# Patient Record
Sex: MALE | Race: WHITE
[De-identification: names, ages, dates, MRNs, and addresses within clinical notes are randomized per-mention and may not be internally consistent; named-entity substitution may affect disease eponyms.]

---

## 2021-01-21 ENCOUNTER — HOSPITAL ENCOUNTER (EMERGENCY)
Dept: HOSPITAL 75 - ER | Age: 31
Discharge: HOME | End: 2021-01-21
Payer: COMMERCIAL

## 2021-01-21 VITALS — BODY MASS INDEX: 51.23 KG/M2 | WEIGHT: 315 LBS | HEIGHT: 65.75 IN

## 2021-01-21 VITALS — SYSTOLIC BLOOD PRESSURE: 145 MMHG | DIASTOLIC BLOOD PRESSURE: 92 MMHG

## 2021-01-21 DIAGNOSIS — E66.9: ICD-10-CM

## 2021-01-21 DIAGNOSIS — U07.1: Primary | ICD-10-CM

## 2021-01-21 DIAGNOSIS — R04.2: ICD-10-CM

## 2021-01-21 LAB
ALBUMIN SERPL-MCNC: 3.9 GM/DL (ref 3.2–4.5)
ALP SERPL-CCNC: 109 U/L (ref 40–136)
ALT SERPL-CCNC: 42 U/L (ref 0–55)
BASOPHILS # BLD AUTO: 0 10^3/UL (ref 0–0.1)
BASOPHILS NFR BLD AUTO: 0 % (ref 0–10)
BILIRUB SERPL-MCNC: 0.5 MG/DL (ref 0.1–1)
BUN/CREAT SERPL: 13
CALCIUM SERPL-MCNC: 9 MG/DL (ref 8.5–10.1)
CHLORIDE SERPL-SCNC: 109 MMOL/L (ref 98–107)
CO2 SERPL-SCNC: 24 MMOL/L (ref 21–32)
CREAT SERPL-MCNC: 0.95 MG/DL (ref 0.6–1.3)
EOSINOPHIL # BLD AUTO: 0 10^3/UL (ref 0–0.3)
EOSINOPHIL NFR BLD AUTO: 0 % (ref 0–10)
GFR SERPLBLD BASED ON 1.73 SQ M-ARVRAT: > 60 ML/MIN
GLUCOSE SERPL-MCNC: 112 MG/DL (ref 70–105)
HCT VFR BLD CALC: 45 % (ref 40–54)
HGB BLD-MCNC: 14.6 G/DL (ref 13.3–17.7)
LYMPHOCYTES # BLD AUTO: 1.2 10^3/UL (ref 1–4)
LYMPHOCYTES NFR BLD AUTO: 14 % (ref 12–44)
MANUAL DIFFERENTIAL PERFORMED BLD QL: NO
MCH RBC QN AUTO: 28 PG (ref 25–34)
MCHC RBC AUTO-ENTMCNC: 32 G/DL (ref 32–36)
MCV RBC AUTO: 86 FL (ref 80–99)
MONOCYTES # BLD AUTO: 0.3 10^3/UL (ref 0–1)
MONOCYTES NFR BLD AUTO: 4 % (ref 0–12)
NEUTROPHILS # BLD AUTO: 7.2 10^3/UL (ref 1.8–7.8)
NEUTROPHILS NFR BLD AUTO: 82 % (ref 42–75)
PLATELET # BLD: 267 10^3/UL (ref 130–400)
PMV BLD AUTO: 10.1 FL (ref 9–12.2)
POTASSIUM SERPL-SCNC: 3.4 MMOL/L (ref 3.6–5)
PROT SERPL-MCNC: 8 GM/DL (ref 6.4–8.2)
SODIUM SERPL-SCNC: 145 MMOL/L (ref 135–145)
WBC # BLD AUTO: 8.8 10^3/UL (ref 4.3–11)

## 2021-01-21 PROCEDURE — 85379 FIBRIN DEGRADATION QUANT: CPT

## 2021-01-21 PROCEDURE — 85025 COMPLETE CBC W/AUTO DIFF WBC: CPT

## 2021-01-21 PROCEDURE — 80053 COMPREHEN METABOLIC PANEL: CPT

## 2021-01-21 PROCEDURE — 71275 CT ANGIOGRAPHY CHEST: CPT

## 2021-01-21 PROCEDURE — 84145 PROCALCITONIN (PCT): CPT

## 2021-01-21 PROCEDURE — 36415 COLL VENOUS BLD VENIPUNCTURE: CPT

## 2021-01-21 PROCEDURE — 86141 C-REACTIVE PROTEIN HS: CPT

## 2021-01-21 NOTE — DIAGNOSTIC IMAGING REPORT
PROCEDURE: CT angiography of the chest with contrast.



TECHNIQUE: Multiple contiguous axial images were obtained through

the chest after uneventful bolus administration of intravenous

contrast. 3D reconstructed CTA MIP acquisitions were also

performed.

Auto Exposure Controls were utilized during the CT exam to meet

ALARA standards for radiation dose reduction. 



INDICATION: COVID, hemoptysis.



COMPARISON: No relevant comparison.



FINDINGS: The pulmonary arterial luminal opacification is limited

beyond the lobar level. No identifiable central PE. There are

patchy five-lobe infiltrates, largely groundglass in opacity,

while nonspecific, congruent with the provided history of COVID.

There is no effusion, abscess, or pneumothorax. Heart size and

pulmonary vascularity are unremarkable. Visualized upper abdomen

is unremarkable.



IMPRESSION: Five-lobe groundglass infiltrates. Limited technical

evaluation of the pulmonary arterial branches. No central or

visualized PE.



Dictated by: 



  Dictated on workstation # [id number]

## 2022-03-30 NOTE — ED COUGH/URI
General


Chief Complaint:  Cough/Cold/Flu Symptoms


Stated Complaint:  COVID +, SOB, BLOODY COUGH


Source:  patient


Exam Limitations:  no limitations





History of Present Illness


Date Seen by Provider:  Jan 21, 2021


Time Seen by Provider:  11:10


Initial Comments


To ER with reports of hemoptysis.  This began last night.  He started a baby 

aspirin daily last night.  He tested positive for Covid earlier this week but 

became symptomatic 8 days ago.  He has slight shortness of breath but states 

that his oxygen saturation has never dropped below 95% at home.  He has no 

comorbidities other than obesity.


Timing/Duration:  yesterday


Severity/Quality:  moderate


Associated Symptoms:  cough, shortness of breath





Allergies and Home Medications


Allergies


Coded Allergies:  


     No Allergy Information Available (Unverified , 1/21/21)





Patient Home Medication List


Home Medication List Reviewed:  Yes





Review of Systems


Review of Systems


Constitutional:  see HPI


EENTM:  see HPI


Respiratory:  see HPI, cough


Cardiovascular:  no symptoms reported


Genitourinary:  no symptoms reported


Musculoskeletal:  no symptoms reported


Skin:  no symptoms reported


Psychiatric/Neurological:  No Symptoms Reported


Hematologic/Lymphatic:  No Symptoms Reported


Immunological/Allergic:  no symptoms reported





Physical Exam





Vital Signs - First Documented








 1/21/21





 11:34


 


Pulse 100


 


Resp 18


 


B/P (MAP) 145/92 (109)


 


Pulse Ox 97


 


O2 Delivery Room Air





Capillary Refill :


Height: '"


Weight: lbs. oz. kg;  BMI


Method:


General Appearance:  WD/WN, no apparent distress, obese, other (And oriented 

very pleasant alert and oriented very pleasant.  Oxygen saturation 94 to 95% on 

room air.  He states he has been checking it at home and it has never been below

95%.)


Eyes:  Bilateral Eye Normal Inspection, Bilateral Eye PERRL, Bilateral Eye EOMI


HEENT:  PERRL/EOMI, normal ENT inspection


Neck:  non-tender, full range of motion


Respiratory:  lungs clear, normal breath sounds, no respiratory distress, no 

accessory muscle use


Cardiovascular:  regular rate, rhythm, no murmur


Gastrointestinal:  normal bowel sounds, non tender, soft


Extremities:  normal range of motion, non-tender


Neurologic/Psychiatric:  alert, normal mood/affect, oriented x 3


Skin:  normal color, warm/dry





Progress/Results/Core Measures


Suspected Sepsis


SIRS


Temperature: 


Pulse:  


Respiratory Rate: 


 


Laboratory Tests


1/21/21 11:43: White Blood Count 8.8


Blood Pressure  / 


Mean: 


 





Laboratory Tests


1/21/21 11:43: 


Creatinine 0.95, Platelet Count 267, Total Bilirubin 0.5








Results/Orders


Lab Results





Laboratory Tests








Test


 1/21/21


11:43 Range/Units


 


 


White Blood Count


 8.8 


 4.3-11.0


10^3/uL


 


Red Blood Count


 5.25 


 4.30-5.52


10^6/uL


 


Hemoglobin 14.6  13.3-17.7  g/dL


 


Hematocrit 45  40-54  %


 


Mean Corpuscular Volume 86  80-99  fL


 


Mean Corpuscular Hemoglobin 28  25-34  pg


 


Mean Corpuscular Hemoglobin


Concent 32 


 32-36  g/dL





 


Red Cell Distribution Width 14.3  10.0-14.5  %


 


Platelet Count


 267 


 130-400


10^3/uL


 


Mean Platelet Volume 10.1  9.0-12.2  fL


 


Immature Granulocyte % (Auto) 0   %


 


Neutrophils (%) (Auto) 82 H 42-75  %


 


Lymphocytes (%) (Auto) 14  12-44  %


 


Monocytes (%) (Auto) 4  0-12  %


 


Eosinophils (%) (Auto) 0  0-10  %


 


Basophils (%) (Auto) 0  0-10  %


 


Neutrophils # (Auto)


 7.2 


 1.8-7.8


10^3/uL


 


Lymphocytes # (Auto)


 1.2 


 1.0-4.0


10^3/uL


 


Monocytes # (Auto)


 0.3 


 0.0-1.0


10^3/uL


 


Eosinophils # (Auto)


 0.0 


 0.0-0.3


10^3/uL


 


Basophils # (Auto)


 0.0 


 0.0-0.1


10^3/uL


 


Immature Granulocyte # (Auto)


 0.0 


 0.0-0.1


10^3/uL


 


D-Dimer


 0.55 H


 0.00-0.49


UG/ML


 


Sodium Level 145  135-145  MMOL/L


 


Potassium Level 3.4 L 3.6-5.0  MMOL/L


 


Chloride Level 109 H   MMOL/L


 


Carbon Dioxide Level 24  21-32  MMOL/L


 


Anion Gap 12  5-14  MMOL/L


 


Blood Urea Nitrogen 12  7-18  MG/DL


 


Creatinine


 0.95 


 0.60-1.30


MG/DL


 


Estimat Glomerular Filtration


Rate > 60 


  





 


BUN/Creatinine Ratio 13   


 


Glucose Level 112 H   MG/DL


 


Calcium Level 9.0  8.5-10.1  MG/DL


 


Corrected Calcium 9.1  8.5-10.1  MG/DL


 


Total Bilirubin 0.5  0.1-1.0  MG/DL


 


Aspartate Amino Transf


(AST/SGOT) 35 H


 5-34  U/L





 


Alanine Aminotransferase


(ALT/SGPT) 42 


 0-55  U/L





 


Alkaline Phosphatase 109    U/L


 


C-Reactive Protein High


Sensitivity 29.03 H


 0.00-0.50


MG/DL


 


Total Protein 8.0  6.4-8.2  GM/DL


 


Albumin 3.9  3.2-4.5  GM/DL








My Orders





Orders - PREET WOO


Hs C Reactive Protein (1/21/21 11:35)


Fibrin Degradation Products (1/21/21 11:35)


Cbc With Automated Diff (1/21/21 11:35)


Comprehensive Metabolic Panel (1/21/21 11:35)


Ed Iv/Invasive Line Start (1/21/21 11:35)


Ct Angio Chest W (1/21/21 11:35)


Iohexol Injection (Omnipaque 350 Mg/Ml 1 (1/21/21 12:00)


Received Contrast (Hold Metformin- Contr (1/21/21 12:00)


Sodium Chloride Flush (Catheter Flush Sy (1/21/21 12:00)


Ns (Ivpb) (Sodium Chloride 0.9% Ivpb Bag (1/21/21 12:00)


Procalcitonin (Pct) (1/21/21 13:39)





Medications Given in ED





Current Medications








 Medications  Dose


 Ordered  Sig/Dian


 Route  Start Time


 Stop Time Status Last Admin


Dose Admin


 


 Iohexol  100 ml  ONCE  ONCE


 IV  1/21/21 12:00


 1/21/21 12:01 DC 1/21/21 13:20


88 ML


 


 Sodium Chloride  10 ml  AS NEEDED  PRN


 IV  1/21/21 12:00


    1/21/21 13:20


10 ML


 


 Sodium Chloride  100 ml  ONCE  ONCE


 IV  1/21/21 12:00


 1/21/21 12:01 DC 1/21/21 13:20


80 ML








Vital Signs/I&O











 1/21/21 1/21/21





 11:34 11:43


 


Pulse 100 


 


Resp 18 


 


B/P (MAP) 145/92 (109) 


 


Pulse Ox 97 


 


O2 Delivery Room Air Room Air





Capillary Refill :





Departure


Communication (Admissions)


We will discharged home, I spoke with him about keeping a close eye on his 

oxygen saturation as he is not out of the woods yet.  I am going to go ahead and

have him continue the baby aspirin, I will start dexamethasone and Zithromax as 

well as promethazine/codeine for cough.  He has a pulse oximeter at home and 

will keep an eye on his oxygen.





Impression





   Primary Impression:  


   Hemoptysis


   Additional Impression:  


   COVID-19


Disposition:  01 HOME, SELF-CARE


Condition:  Stable





Departure-Patient Inst.


Decision time for Depature:  13:46


Patient Instructions:  Coronavirus Disease 2019 (COVID-19) ED





Add. Discharge Instructions:  


1.  Keep a close eye on your oxygen saturation.  If it drops below 90% you will 

need to return to the emergency room to be admitted for oxygen supplement.  

Continue to take your daily baby aspirin.  Take the steroids and antibiotics as 

directed as well as the cough medication.





All discharge instructions reviewed with patient and/or family. Voiced 

understanding.


Scripts


Promethazine HCl/Codeine (Prometh-Codein 6.25-10 mg/5 ml) 5 Ml Syrup


5 ML PO Q4H PRN for COUGH, #90 ML


   Prov: PREET WOO         1/21/21 


Azithromycin (Azithromycin) 250 Mg Tablet


250 MG PO UD, #6 TAB


   TAKE 2 TABLETS ON DAY ONE THEN TAKE 1 TABLET DAILY FOR FOUR MORE DAYS


   Prov: PREET WOO         1/21/21 


Dexamethasone (Dexamethasone) 6 Mg Tablet


6 MG PO DAILY, #7 TAB


   Prov: PREET WOO         1/21/21











PRETE WOO             Jan 21, 2021 11:43
BMI greater than 40